# Patient Record
Sex: MALE | Race: WHITE | NOT HISPANIC OR LATINO | URBAN - METROPOLITAN AREA
[De-identification: names, ages, dates, MRNs, and addresses within clinical notes are randomized per-mention and may not be internally consistent; named-entity substitution may affect disease eponyms.]

---

## 2019-01-03 ENCOUNTER — OUTPATIENT (OUTPATIENT)
Dept: OUTPATIENT SERVICES | Facility: HOSPITAL | Age: 54
LOS: 1 days | End: 2019-01-03

## 2019-01-03 ENCOUNTER — APPOINTMENT (OUTPATIENT)
Dept: CV DIAGNOSTICS | Facility: HOSPITAL | Age: 54
End: 2019-01-03
Payer: OTHER MISCELLANEOUS

## 2019-01-03 DIAGNOSIS — I25.10 ATHEROSCLEROTIC HEART DISEASE OF NATIVE CORONARY ARTERY WITHOUT ANGINA PECTORIS: ICD-10-CM

## 2019-01-03 PROCEDURE — 93016 CV STRESS TEST SUPVJ ONLY: CPT | Mod: GC

## 2019-01-03 PROCEDURE — 78452 HT MUSCLE IMAGE SPECT MULT: CPT | Mod: 26

## 2019-01-03 PROCEDURE — 93018 CV STRESS TEST I&R ONLY: CPT | Mod: GC

## 2021-07-08 ENCOUNTER — TRANSCRIPTION ENCOUNTER (OUTPATIENT)
Age: 56
End: 2021-07-08

## 2021-07-28 ENCOUNTER — TRANSCRIPTION ENCOUNTER (OUTPATIENT)
Age: 56
End: 2021-07-28

## 2022-10-06 ENCOUNTER — APPOINTMENT (OUTPATIENT)
Dept: OTOLARYNGOLOGY | Facility: CLINIC | Age: 57
End: 2022-10-06

## 2022-10-06 VITALS
HEIGHT: 73 IN | BODY MASS INDEX: 25.45 KG/M2 | DIASTOLIC BLOOD PRESSURE: 78 MMHG | WEIGHT: 192 LBS | HEART RATE: 77 BPM | SYSTOLIC BLOOD PRESSURE: 132 MMHG

## 2022-10-06 DIAGNOSIS — J34.2 DEVIATED NASAL SEPTUM: ICD-10-CM

## 2022-10-06 PROCEDURE — 99204 OFFICE O/P NEW MOD 45 MIN: CPT | Mod: 25

## 2022-10-06 PROCEDURE — 31575 DIAGNOSTIC LARYNGOSCOPY: CPT

## 2022-10-06 NOTE — REVIEW OF SYSTEMS
[Sneezing] : sneezing [Seasonal Allergies] : seasonal allergies [Post Nasal Drip] : post nasal drip [Ear Drainage] : ear drainage [Ear Noises] : ear noises [Nasal Congestion] : nasal congestion [Recurrent Sinus Infections] : recurrent sinus infections [Sinus Pain] : sinus pain [Sinus Pressure] : sinus pressure [Sense Of Smell Problem] : sense of smell problem [Discolored Nasal Discharge] : discolored nasal discharge [Hoarseness] : hoarseness [Wheezing] : wheezing [Cough] : cough [SOB on Exertion] : shortness of breath during exertion [Negative] : Heme/Lymph [FreeTextEntry1] : watery itchy eyes,headache,daytime sleepiness,easy bruising

## 2022-10-06 NOTE — HISTORY OF PRESENT ILLNESS
[de-identified] : 57 yr old male was dx w asthma during routine physical 3/2022.  Had further work up at [place] and [facility].\par 911  with [employer]\par Primary care doctor is concerned about possible GERD. 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

## 2022-10-06 NOTE — PHYSICAL EXAM
[] : septum deviated to the left [Normal] : mucosa is normal [Midline] : trachea located in midline position [de-identified] : scarred AU

## 2022-10-28 ENCOUNTER — RESULT REVIEW (OUTPATIENT)
Age: 57
End: 2022-10-28

## 2022-10-31 ENCOUNTER — NON-APPOINTMENT (OUTPATIENT)
Age: 57
End: 2022-10-31

## 2022-11-01 ENCOUNTER — NON-APPOINTMENT (OUTPATIENT)
Age: 57
End: 2022-11-01

## 2022-11-01 ENCOUNTER — OUTPATIENT (OUTPATIENT)
Dept: OUTPATIENT SERVICES | Facility: HOSPITAL | Age: 57
LOS: 1 days | End: 2022-11-01
Payer: COMMERCIAL

## 2022-11-01 ENCOUNTER — APPOINTMENT (OUTPATIENT)
Dept: OTOLARYNGOLOGY | Facility: CLINIC | Age: 57
End: 2022-11-01

## 2022-11-01 DIAGNOSIS — R13.14 DYSPHAGIA, PHARYNGOESOPHAGEAL PHASE: ICD-10-CM

## 2022-11-01 PROCEDURE — 31579 LARYNGOSCOPY TELESCOPIC: CPT

## 2022-11-01 PROCEDURE — 74230 X-RAY XM SWLNG FUNCJ C+: CPT

## 2022-11-01 PROCEDURE — 92612 ENDOSCOPY SWALLOW (FEES) VID: CPT | Mod: GN

## 2022-11-01 PROCEDURE — 92611 MOTION FLUOROSCOPY/SWALLOW: CPT

## 2022-11-01 PROCEDURE — 74230 X-RAY XM SWLNG FUNCJ C+: CPT | Mod: 26

## 2022-11-01 NOTE — ASSESSMENT
[FreeTextEntry1] : MODIFIED BARIUM SWALLOW STUDY     \par \par Date of Evaluation: 11/1/22 \par Patient Name: Jesus Raymundo \par YOB: 1965 \par Date of Onset: ~1 to 2 years \par Medical Diagnosis: Pharyngoesophageal dysphagia (787.24) (R13.14) \par Treatment Diagnosis: Dysphagia (R13.12) \par Referring Physician: Dr. Enriquez \par \par IMPRESSIONS/RESULTS:      \par 1. There was no penetration and/or aspiration pre/during/post swallow across all PO. \par 2. There was trace pharyngeal clearance deficits across liquids, otherwise adequate pharyngeal clearance noted. \par \par RECOMMENDATIONS:     \par 1. Regular solids with thin liquids, as tolerable. \par 2. Aspiration precautions. \par 3. Safe swallowing guidelines: position upright 90 degrees, small bite/sip, alternate bite/sip, pace meal slowly, and remain upright ~30 minutes post meal. \par 4. Ongoing oral care. \par 5. Consider GI consult given mild hold up of barium tablet in mid esophageal region. \par 6. Follow up with referring physician, as directed. \par \par HISTORY: This 57 year old male was seen this morning for a Modified Barium Swallow Study to rule out aspiration, assess for diet texture change as appropriate, and/or explore compensatory strategies to eliminate penetration/aspiration. \par \par REASON FOR REFERRAL: To determine patient's ability to safely tolerate an oral diet. The physician ordered this procedure because he wants the patient to meet her nutrition/hydration needs by mouth without compromising respiratory status. \par Patient independently arrived to [facility] and served as a reliable informant. Per patient report, he has been experiencing intermittent coughing during deglutition of solids and liquids, as well as saliva, for approximately 1 to 2 years. Patient reported he has not been able to identify a pattern or frequency to coughing episodes. Patient has continued to consume a regular diet with thin liquids. Patient denied history of recent/recurrent pneumonia and unintentional weight loss. Patient reported questionable history of GERD, though has not undergone medical intervention. Patient is status post ENT consult 10/6, who recommended MBS to objectively assess oropharyngeal swallow mechanism. Patient scheduled for FEES post MBS today. \par \par MEDICAL HISTORY: as per EMR, patient’s past medical history is significant for: \par Active Problems \par Male infertility (606.9) (N46.9) \par Scrotal varices - varicocele (456.4) (I86.1) \par Undescended testicle (752.51) (Q53.9) \par \par Past Medical History \par Unreviewed Unverified: History of asthma (V12.69) (Z87.09) \par Unreviewed Unverified: History of thyroid disorder (V12.29) (Z86.39) \par \par Surgical History \par Unreviewed Unverified: History of Varicose vein ligation \par History of Hernia Repair \par History of Tonsillectomy \par \par Current Nutritional Intake:  \par 1. solids: thin liquids \par 2. liquids: regular solids \par \par Current Respiratory Status: room air \par \par ASSESSMENT     \par Patient was ambulatory and stood in lateral and anterior plane of view for purposes of study. Patient was A&Ox4 and agreeable to study. Patient was tolerating room air with adequate secretion management. Patient followed commands for purposes of study. Patient’s oral musculature was WFL. Patient’s vocal quality/intensity and speech production was WFL. Patient denied pain pre and post study. \par \par Consistencies Administered: \par 1. Liquids: mildly thick liquids, thin liquids \par 2. Solids: puree, regular solids \par \par SUMMARY & IMPRESSION  \par \par Preliminary Fluoroscopy reveals:  \par 1. There was adequate velopharyngeal closure. \par 2. There was adequate hyolaryngeal elevation/excursion and adequate epiglottic retroflexion. \par \par Videofluoroscopic Evaluation Reveals:   \par Patient self-administered PO trials of thin liquids, mildly thick liquids, puree, and regular solids. Patient presents with: \par \par 1. Functional oral phase marked by adequate retrieval and containment, timely mastication, adequate bolus cohesion, timely posterior transfer and transit, and adequate clearance post swallow. \par 2. Functional pharyngeal phase marked by timely pharyngeal swallow trigger, adequate BOT retraction, adequate hyolaryngeal elevation/excursion, and complete epiglottic retroflexion. There was no penetration and/or aspiration pre/during/post swallow. There was trace pharyngeal residue at the level of the BOT, valleculae, and pyriforms post swallow with thin liquids and mildly thick liquids. Otherwise, there was adequate pharyngeal clearance post swallow with remainder of PO.  \par \par Esophageal screen: Patient was given a regular solid bolus and barium tablet with water. The regular solid was observed to pass from the oral cavity to the level of the stomach without hold up. With the barium tablet, there was initial hold up observed at the mid esophageal region, per radiologist report. The tablet then passed to the level of the stomach when cued to increase water intake. This cannot be considered a formal assessment of esophageal function. Consider GI work up for further management. \par \par Aspiration - Penetration Scale: 1- thin liquids, mildly thick liquids, puree, and regular solids \par \par Aspiration - Penetration Scale (Graciebek et al Dysphagia 11:93-98 (April 1996), Aspiration-Penetration Scale)     \par 1. Material does not enter the airway     \par 2. Material enters the airway, remains above the vocal folds, and is ejected from the airway     \par 3. Material enters the airway, remains above the vocal folds, and is not ejected     \par 4. Material enters the airway, contacts the vocal folds, and is ejected from the airway     \par 5. Material enters the airway, contacts the vocal folds, and is not ejected from the airway     \par 6. Material enters the airway, passes below the vocal folds and is ejected into the larynx or out of the airway     \par 7. Material enters the airway, passes below the vocal folds, and is not ejected from the trachea despite effort     \par 8. Material enters the airway, passes below the vocal folds, and no effort is made to eject     \par \par      \par EDUCATION:\par The above results and recommendations have been discussed in length with the patient, who verbalized understanding and is in agreement with plan of care. Should you have any additional concerns, please contact the Center at [phone].     \par \par      \par \par Lindsey Rosales M.S., CCC-SLP     \par Speech-Language Pathologist     \par [facility] [place].

## 2022-11-02 ENCOUNTER — NON-APPOINTMENT (OUTPATIENT)
Age: 57
End: 2022-11-02

## 2022-11-03 NOTE — ASSESSMENT
[FreeTextEntry1] : REPORT OF EVALUATION OF SWALLOW FUNCTION VIA FLEXIBLE ENDOSCOPIC EXAMINATION OF SWALLOWING (FEES) and ASSESSMENT OF VOCAL FUNCTION VIA VIDEOSTROBOSCOPY \par \par HISTORY: Information on this patient has been provided by the patient and via chart review. Jesus Raymundo was seen for a Flexible Endoscopic Examination of swallowing to: _x__rule out aspiration; _x__assess for diet texture change as appropriate; and/or __x_ explore positional strategies and/or compensatory techniques to eliminate aspiration. Videostroboscopy assessment was also conducted for a formal assessment of the vocal mechanism. \par \par REASON FOR REFERRAL: Mr. Raymundo is a 57-year-old male who was referred by his otolaryngologist, Dr. Enriquez. The patient reports a one-year history of chronic throat clearing, vocal quality changes, and difficulty swallowing saliva, thin fluids and medications. Upon probing, patient endorses being diagnosed with asthma in March 2022 which prompted current medical work up. He notes throat clearing and coughing with saliva, and occasionally with thin fluids. He also notes occasional globus sensation for solids and pills. With regard to his voice, Mr. 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 He denies unintentional weight loss and/or recent/repeated PNA.\par \par MEDICAL HISTORY, as per charting:\par Active Problems\par Medical History:\par Nasal septal deviation (470) (J34.2)\par Pharyngoesophageal dysphagia (787.24) (R13.14)\par Dysphonia (784.42) (R49.0)\par \par Current Respiratory Status: Room air\par \par VIDEOSTROBOSCOPY: \par The patient was explained the videostrobscopy and FEES procedures, and consent was obtained. A nasendoscope was passed via the right nare without difficulty and positioned above the supraglottic structures. There is a patchy, white film at the base of tongue consistent with possible thrush. The epiglottis, aryepiglottic folds, arytenoids, and true vocal folds were clearly visible with mild edema and erythema of the bilateral arytenoids which extends into the false vocal cords. Assessment of the true vocal folds revealed mild erythema at the posterior aspect, which is visualized more so during vocal fold adduction. Further assessment of true vocal fold abduction and adduction revealed bilateral motion with full closure during phonation. There is mild hyperfunction resulting in over-contraction of the bilateral arytenoids and false vocal cords, which may have been impacted by patient discomfort with scope placement. Amplitude and magnitude of mucosal wave were relatively WFLs. \par \par ASSESSMENT \par Consistencies Administered: \par Solids: _x__ Regular ___Mechanical Soft __x_Pureed \par Liquids: _x_ Thin ___ Nectar Thick __Honey Thick \par _x_ single cup sips ___ by straw \par \par FEES PROCEDURE:\par For the purposes of today’s assessment, the patient was provided with pureed consistency, regular solids and thin liquids via spoon and straw. Patient presents with functional oral and pharyngeal stages of swallowing. The oral stage was characterized by adequate acceptance of trials, adequate bolus collection and formation for pureed, solids and thin liquids, adequate AP transport and oral transit time without evidence of premature spillage for any consistency. Pharyngeal stage was marked by mildly reduced pharyngeal contractility, however with prompt swallow trigger initiation, adequate tongue base retraction, and adequate hyolaryngeal excursion with complete laryngeal vestibular closure. There were no instances of penetration or aspiration. There was trace residue viewed primarily along the posterior pharyngeal wall which cleared with volitional secondary swallow on part of patient. There was also evidence of LPR suggested by return of green tinged material to the level of the PES and pyriform sinuses intermittently across all offered consistencies. At this point, the scope was carefully removed with the patient tolerating the entire procedure without difficulty.\par \par Aspiration - Penetration Scale: 1 - Regular Solid; Pureed; Thin Liquid\par \par Aspiration - Penetration Scale (Silverk et al Dysphagia 11:93-98 (April 1996), Aspiration-Penetration Scale) \par 1. Material does not enter the airway \par 2. Material enters the airway, remains above the vocal folds, and is ejected from the airway \par 3. Material enters the airway, remains above the vocal folds, and is not ejected \par 4. Material enters the airway, contacts the vocal folds, and is ejected from the airway \par 5. Material enters the airway, contacts the vocal folds, and is not ejected from the airway \par 6. Material enters the airway, passes below the vocal folds and is ejected into the larynx or out of the airway \par 7. Material enters the airway, passes below the vocal folds, and is not ejected from the trachea despite effort \par 8. Material enters the airway, passes below the vocal folds, and no effort is made to eject \par \par PROGNOSIS: Good for recommended diet and functional communication. \par \par IMPRESSIONS: \par 1) Mild dysphonia likely associated with LPR\par 2) Pharyngeal Swallow is WFL\par 3) +LPR as described above\par \par RECOMMENDATIONS:\par 1. Continue oral diet of regular solids and thin fluids as tolerated\par 2. Follow up with otolaryngologist and consider GI consult with regard to LPR/DESMOND management.\par 3. Consider adherence to LPR lifestyle and diet modifications\par 4. Consider short course of Voice Therapy ([id number]) if vocal difficulties do not improve with reflux management. Swallow therapy is not warranted at this time given intact pharyngeal stage of swallow.\par \par Cate Ortiz MA, CCC-SLP\par Speech-Language Pathologist\par [facility]\par

## 2023-03-21 ENCOUNTER — APPOINTMENT (OUTPATIENT)
Dept: OTOLARYNGOLOGY | Facility: CLINIC | Age: 58
End: 2023-03-21
Payer: OTHER MISCELLANEOUS

## 2023-03-21 VITALS
BODY MASS INDEX: 25.84 KG/M2 | HEART RATE: 51 BPM | DIASTOLIC BLOOD PRESSURE: 64 MMHG | WEIGHT: 195 LBS | SYSTOLIC BLOOD PRESSURE: 102 MMHG | HEIGHT: 73 IN

## 2023-03-21 PROCEDURE — 99214 OFFICE O/P EST MOD 30 MIN: CPT

## 2023-03-21 RX ORDER — OMEPRAZOLE 20 MG/1
20 CAPSULE, DELAYED RELEASE ORAL TWICE DAILY
Qty: 60 | Refills: 5 | Status: ACTIVE | COMMUNITY
Start: 2023-03-21 | End: 1900-01-01

## 2023-03-21 NOTE — ASSESSMENT
[FreeTextEntry1] : diet/lifestyle instructions given to pt\par rx omeprazole bid\par UGI endoscopy pending\par pt given test results to share with GI MD\par f/u 1 month

## 2023-03-21 NOTE — DATA REVIEWED
[de-identified] : 11/1/2022 MBS  WNL except for initial hold up of barium tablet in mid-esophagus\par 11/1 FEES  +trace residue along post phar wall, evidence of LPR to the level of PES and pyriform sinuses\par 11/1 strobe mild edema of false cords and arytenoids

## 2023-03-21 NOTE — HISTORY OF PRESENT ILLNESS
[de-identified] : 58 yr old male was dx w asthma during routine physical 3/2022.  Had further work up at [place] and [facility].\par 911  with [employer]\par Primary care doctor is concerned about possible GERD. 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

## 2023-03-21 NOTE — PHYSICAL EXAM
[] : septum deviated to the left [Normal] : mucosa is normal [Midline] : trachea located in midline position [de-identified] : scarred AU

## 2023-04-27 ENCOUNTER — APPOINTMENT (OUTPATIENT)
Dept: OTOLARYNGOLOGY | Facility: CLINIC | Age: 58
End: 2023-04-27
Payer: COMMERCIAL

## 2023-04-27 VITALS
WEIGHT: 195 LBS | HEART RATE: 56 BPM | BODY MASS INDEX: 25.84 KG/M2 | DIASTOLIC BLOOD PRESSURE: 67 MMHG | HEIGHT: 73 IN | SYSTOLIC BLOOD PRESSURE: 99 MMHG

## 2023-04-27 DIAGNOSIS — R49.0 DYSPHONIA: ICD-10-CM

## 2023-04-27 DIAGNOSIS — R13.14 DYSPHAGIA, PHARYNGOESOPHAGEAL PHASE: ICD-10-CM

## 2023-04-27 DIAGNOSIS — K21.9 GASTRO-ESOPHAGEAL REFLUX DISEASE W/OUT ESOPHAGITIS: ICD-10-CM

## 2023-04-27 PROCEDURE — 99213 OFFICE O/P EST LOW 20 MIN: CPT

## 2023-04-27 NOTE — HISTORY OF PRESENT ILLNESS
[de-identified] : 58 yr old male was dx w asthma during routine physical 3/2022.  Had further work up at [place] and [facility].\par 911  with [employer]\par Primary care doctor is concerned about possible GERD. 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

## 2023-04-27 NOTE — ASSESSMENT
[FreeTextEntry1] : some improvement on omeprazole bid\par UGI and colonoscopy pending\par f/u 3 month

## 2023-04-27 NOTE — PHYSICAL EXAM
[de-identified] : scarred AU [] : septum deviated to the left [Normal] : mucosa is normal [Midline] : trachea located in midline position

## 2023-04-27 NOTE — DATA REVIEWED
[de-identified] : 11/1/2022 MBS  WNL except for initial hold up of barium tablet in mid-esophagus\par 11/1 FEES  +trace residue along post phar wall, evidence of LPR to the level of PES and pyriform sinuses\par 11/1 strobe mild edema of false cords and arytenoids